# Patient Record
Sex: FEMALE | Race: WHITE | NOT HISPANIC OR LATINO | Employment: OTHER | URBAN - METROPOLITAN AREA
[De-identification: names, ages, dates, MRNs, and addresses within clinical notes are randomized per-mention and may not be internally consistent; named-entity substitution may affect disease eponyms.]

---

## 2017-02-28 ENCOUNTER — OFFICE VISIT (OUTPATIENT)
Dept: GASTROENTEROLOGY | Facility: CLINIC | Age: 72
End: 2017-02-28

## 2017-02-28 VITALS
WEIGHT: 116 LBS | SYSTOLIC BLOOD PRESSURE: 124 MMHG | HEIGHT: 61 IN | BODY MASS INDEX: 21.9 KG/M2 | DIASTOLIC BLOOD PRESSURE: 72 MMHG

## 2017-02-28 DIAGNOSIS — R19.5 HEME POSITIVE STOOL: Primary | ICD-10-CM

## 2017-02-28 DIAGNOSIS — K21.9 GASTROESOPHAGEAL REFLUX DISEASE, ESOPHAGITIS PRESENCE NOT SPECIFIED: ICD-10-CM

## 2017-02-28 DIAGNOSIS — Z79.01 ANTICOAGULANT LONG-TERM USE: ICD-10-CM

## 2017-02-28 DIAGNOSIS — D64.9 ANEMIA, UNSPECIFIED TYPE: ICD-10-CM

## 2017-02-28 DIAGNOSIS — M34.9 SCLERODERMA (HCC): ICD-10-CM

## 2017-02-28 PROCEDURE — 99214 OFFICE O/P EST MOD 30 MIN: CPT | Performed by: NURSE PRACTITIONER

## 2017-02-28 RX ORDER — PREDNISONE 1 MG/1
5 TABLET ORAL
COMMUNITY

## 2017-02-28 RX ORDER — FUROSEMIDE 40 MG/1
TABLET ORAL
Refills: 4 | COMMUNITY
Start: 2016-11-29

## 2017-02-28 RX ORDER — SODIUM CHLORIDE 0.9 % (FLUSH) 0.9 %
1-10 SYRINGE (ML) INJECTION AS NEEDED
Status: CANCELLED | OUTPATIENT
Start: 2017-02-28

## 2017-02-28 RX ORDER — MIRTAZAPINE 15 MG/1
15 TABLET, FILM COATED ORAL
COMMUNITY
Start: 2017-01-24

## 2017-02-28 RX ORDER — HYDROCODONE BITARTRATE AND ACETAMINOPHEN 7.5; 325 MG/1; MG/1
1 TABLET ORAL
COMMUNITY
Start: 2017-02-07 | End: 2017-03-09

## 2017-02-28 RX ORDER — ESOMEPRAZOLE MAGNESIUM 40 MG/1
40 CAPSULE, DELAYED RELEASE ORAL DAILY
Qty: 90 CAPSULE | Refills: 3 | Status: SHIPPED | OUTPATIENT
Start: 2017-02-28

## 2017-02-28 RX ORDER — LISINOPRIL 40 MG/1
40 TABLET ORAL
COMMUNITY

## 2017-02-28 RX ORDER — ESOMEPRAZOLE MAGNESIUM 40 MG/1
40 CAPSULE, DELAYED RELEASE ORAL
COMMUNITY
End: 2017-02-28

## 2017-02-28 RX ORDER — ATORVASTATIN CALCIUM 10 MG/1
10 TABLET, FILM COATED ORAL
COMMUNITY
Start: 2016-09-23

## 2017-02-28 RX ORDER — CLOPIDOGREL BISULFATE 75 MG/1
75 TABLET ORAL
COMMUNITY
Start: 2017-02-10 | End: 2017-02-28

## 2017-02-28 NOTE — PROGRESS NOTES
Chief Complaint   Patient presents with   • blood on toilet paper   • Heartburn     Subjective     HPI  Catrian Bennett is a 71 y.o. female who presents or a follow-up visit visit.  She is previously followed by Dr. Whitmore with a history of scleroderma.  She continues to have issues with breathing and is followed by Dr. Whitney (Pulmonary).  She is also on Eliquis and her cardiologist would like to restart Plavix for her cardiac stents.  Seen by her PCP 2 weeks ago and found to have positive occult stools.  According to records her hemoglobin is slowly drifting drown.  In October 2016 her hemoglobin is 11.7 prior to that was 12.1.  When seen by her physician in the beginning of February 2017 it was 10.0.  She has a history of GERD that she feels is well controlled on his is esomeprazole.  She has occasional mild epigastric burning with no identifiable triggering or alleviating factors.  She denies NSAID use or alcohol use.  Denies bright red blood per rectum, black tarry stools, or hematemesis.   She denies heartburn, reflux, dysphagia, odynophagia, or globus sensation     We reviewed the colonoscopy results from 3/2015 with tics only.  Her last EGD was 2013 with a few benign gastric polyps and gastritis negative H. Pylori.    Past Medical History   Diagnosis Date   • Breast cancer    • Diverticulosis    • GERD (gastroesophageal reflux disease)    • Hyperlipidemia    • Hypertension    • Osteoarthritis    • Osteoporosis    • Ovarian cancer    • Scleroderma    • Shingles    • SVT (supraventricular tachycardia)      Past Surgical History   Procedure Laterality Date   • Breast augmentation     • Hysterectomy     • Tonsillectomy     • Cardiac ablation     • Mastectomy     • Cataract extraction     • Thyroid surgery     • Endoscopy  04/01/2013     gastric mucosal abnormality characterized by erythema, a few gastric polyps , chronic inflammation, benign polyps, occ bacteria but no definite h pylori   • Colonoscopy  03/19/2015      tics   • Colonoscopy  10/07/2009     tics, ih, ulceration         Social History     Social History   • Marital status: Unknown     Spouse name: N/A   • Number of children: N/A   • Years of education: N/A     Social History Main Topics   • Smoking status: Never Smoker   • Smokeless tobacco: None   • Alcohol use No   • Drug use: No   • Sexual activity: Not Asked     Other Topics Concern   • None     Social History Narrative         Current Outpatient Prescriptions:   •  apixaban (ELIQUIS) 5 MG tablet tablet, TAKE 1 TABLET BY MOUTH 2 (TWO) TIMES DAILY, Disp: , Rfl:   •  atorvastatin (LIPITOR) 10 MG tablet, Take 10 mg by mouth., Disp: , Rfl:   •  furosemide (LASIX) 40 MG tablet, TAKE 1 TABLET BY MOUTH EVERY DAY, Disp: , Rfl: 4  •  HYDROcodone-acetaminophen (NORCO) 7.5-325 MG per tablet, Take 1 tablet by mouth., Disp: , Rfl:   •  lisinopril (PRINIVIL,ZESTRIL) 40 MG tablet, Take 40 mg by mouth., Disp: , Rfl:   •  Macitentan (OPSUMIT) 10 MG tablet, , Disp: , Rfl:   •  metoprolol tartrate (LOPRESSOR) 25 MG tablet, Take 25 mg by mouth., Disp: , Rfl:   •  mirtazapine (REMERON) 15 MG tablet, Take 15 mg by mouth., Disp: , Rfl:   •  predniSONE (DELTASONE) 5 MG tablet, Take 5 mg by mouth., Disp: , Rfl:   •  esomeprazole (nexIUM) 40 MG capsule, Take 1 capsule by mouth Daily., Disp: 90 capsule, Rfl: 3    Review of Systems   Constitutional: Negative for appetite change and unexpected weight change.   HENT: Negative for trouble swallowing.    Respiratory: Positive for chest tightness and shortness of breath. Negative for choking.    Cardiovascular: Negative for chest pain.   Gastrointestinal: Positive for abdominal pain. Negative for abdominal distention, blood in stool, constipation, diarrhea, nausea and vomiting.   Musculoskeletal: Negative for back pain.   Skin: Negative for color change.   Neurological: Negative for dizziness.   Hematological: Does not bruise/bleed easily.   Psychiatric/Behavioral: Negative.        Objective    Vitals:    02/28/17 0912   BP: 124/72       Physical Exam   Constitutional: She is oriented to person, place, and time. She appears well-developed and well-nourished.   HENT:   Head: Normocephalic and atraumatic.   Eyes: Pupils are equal, round, and reactive to light.   Cardiovascular: Normal rate, regular rhythm and normal heart sounds.    Pulmonary/Chest: Effort normal and breath sounds normal.   Abdominal: Soft. Bowel sounds are normal. She exhibits no shifting dullness, no distension, no pulsatile liver, no fluid wave, no abdominal bruit, no ascites, no pulsatile midline mass and no mass. There is no hepatosplenomegaly. There is no tenderness. There is no rigidity and no guarding. No hernia.   Genitourinary:   Genitourinary Comments: Patient deferred rectal exam due to recent positive occult test . Denies hemorrhoids   Musculoskeletal: Normal range of motion.   Neurological: She is alert and oriented to person, place, and time.   Skin: Skin is warm and dry.   Psychiatric: She has a normal mood and affect. Her behavior is normal. Thought content normal.   Nursing note and vitals reviewed.      Assessment/Plan   Catrina was seen today for blood on toilet paper and heartburn.    Diagnoses and all orders for this visit:    Heme positive stool  -     Ferritin  -     Folate  -     Iron Profile  -     Vitamin B12  -     Transferrin  -     CBC & Differential  -     Comprehensive Metabolic Panel  -     Case Request; Standing  -     sodium chloride 0.9 % flush 1-10 mL; Infuse 1-10 mL into a venous catheter As Needed for line care.  -     Case Request  -     Case Request; Standing  -     sodium chloride 0.9 % flush 1-10 mL; Infuse 1-10 mL into a venous catheter As Needed for line care.  -     Case Request    Anemia, unspecified type  -     Ferritin  -     Folate  -     Iron Profile  -     Vitamin B12  -     Transferrin  -     CBC & Differential  -     Comprehensive Metabolic Panel  -     Case Request; Standing  -     sodium  chloride 0.9 % flush 1-10 mL; Infuse 1-10 mL into a venous catheter As Needed for line care.  -     Case Request  -     Case Request; Standing  -     sodium chloride 0.9 % flush 1-10 mL; Infuse 1-10 mL into a venous catheter As Needed for line care.  -     Case Request    Gastroesophageal reflux disease, esophagitis presence not specified    Scleroderma    Anticoagulant long-term use  -     Case Request; Standing  -     sodium chloride 0.9 % flush 1-10 mL; Infuse 1-10 mL into a venous catheter As Needed for line care.  -     Case Request    Other orders  -     Implement Anesthesia orders day of procedure.; Standing  -     Obtain informed consent; Standing  -     Verify bowel prep was successful; Standing  -     Give tap water enema if bowel prep was insufficient; Standing  -     Insert Peripheral IV; Standing  -     Saline Lock & Maintain IV Access; Standing  -     Implement Anesthesia orders day of procedure.; Standing  -     Obtain informed consent; Standing  -     Insert Peripheral IV; Standing  -     Saline Lock & Maintain IV Access; Standing  -     esomeprazole (nexIUM) 40 MG capsule; Take 1 capsule by mouth Daily.    Check anemia studies with heme positive stool and slowly decreasing hemoglobin while on Eliquis.  Her cardiologist also wants to start Plavix in addition to Eliqius. Will discuss with Dr. Whitmore risk/benefit of endoscopic evaluation given her pulmonary status.  We'll call Dr. [phone]  Pulmonary and her cardiologist, Dr Almeida [phone]  for permission to perform endoscopic evaluation. Eliquis will need to be held x 48hours.   For any additional questions, concerns or changes to your condition after today's office visit please contact the office at [phone].        Luzma SANDHU   [facility], [place], operated by [facility]  [address] [place] [id number]  Office: [phone]

## 2017-03-01 ENCOUNTER — TELEPHONE (OUTPATIENT)
Dept: GASTROENTEROLOGY | Facility: CLINIC | Age: 72
End: 2017-03-01

## 2017-03-01 LAB
ALBUMIN SERPL-MCNC: 4.2 G/DL (ref 3.5–5.2)
ALBUMIN/GLOB SERPL: 1.6 G/DL
ALP SERPL-CCNC: 183 U/L (ref 39–117)
ALT SERPL-CCNC: 20 U/L (ref 1–33)
AST SERPL-CCNC: 20 U/L (ref 1–32)
BASOPHILS # BLD AUTO: 0.04 10*3/MM3 (ref 0–0.2)
BASOPHILS NFR BLD AUTO: 0.5 % (ref 0–1.5)
BILIRUB SERPL-MCNC: 0.2 MG/DL (ref 0.1–1.2)
BUN SERPL-MCNC: 26 MG/DL (ref 8–23)
BUN/CREAT SERPL: 18.4 (ref 7–25)
CALCIUM SERPL-MCNC: 9.4 MG/DL (ref 8.6–10.5)
CHLORIDE SERPL-SCNC: 101 MMOL/L (ref 98–107)
CO2 SERPL-SCNC: 23.5 MMOL/L (ref 22–29)
CREAT SERPL-MCNC: 1.41 MG/DL (ref 0.57–1)
EOSINOPHIL # BLD AUTO: 0.25 10*3/MM3 (ref 0–0.7)
EOSINOPHIL NFR BLD AUTO: 3 % (ref 0.3–6.2)
ERYTHROCYTE [DISTWIDTH] IN BLOOD BY AUTOMATED COUNT: 17.2 % (ref 11.7–13)
FERRITIN SERPL-MCNC: 17.45 NG/ML (ref 13–150)
FOLATE SERPL-MCNC: 11.61 NG/ML (ref 4.78–24.2)
GLOBULIN SER CALC-MCNC: 2.6 GM/DL
GLUCOSE SERPL-MCNC: 76 MG/DL (ref 65–99)
HCT VFR BLD AUTO: 34.9 % (ref 35.6–45.5)
HGB BLD-MCNC: 10.3 G/DL (ref 11.9–15.5)
IMM GRANULOCYTES # BLD: 0.04 10*3/MM3 (ref 0–0.03)
IMM GRANULOCYTES NFR BLD: 0.5 % (ref 0–0.5)
IRON SATN MFR SERPL: 9 % (ref 20–50)
IRON SERPL-MCNC: 37 MCG/DL (ref 37–145)
LYMPHOCYTES # BLD AUTO: 1.05 10*3/MM3 (ref 0.9–4.8)
LYMPHOCYTES NFR BLD AUTO: 12.5 % (ref 19.6–45.3)
MCH RBC QN AUTO: 25.3 PG (ref 26.9–32)
MCHC RBC AUTO-ENTMCNC: 29.5 G/DL (ref 32.4–36.3)
MCV RBC AUTO: 85.7 FL (ref 80.5–98.2)
MONOCYTES # BLD AUTO: 0.49 10*3/MM3 (ref 0.2–1.2)
MONOCYTES NFR BLD AUTO: 5.8 % (ref 5–12)
NEUTROPHILS # BLD AUTO: 6.52 10*3/MM3 (ref 1.9–8.1)
NEUTROPHILS NFR BLD AUTO: 77.7 % (ref 42.7–76)
PLATELET # BLD AUTO: 334 10*3/MM3 (ref 140–500)
POTASSIUM SERPL-SCNC: 4.5 MMOL/L (ref 3.5–5.2)
PROT SERPL-MCNC: 6.8 G/DL (ref 6–8.5)
RBC # BLD AUTO: 4.07 10*6/MM3 (ref 3.9–5.2)
SODIUM SERPL-SCNC: 141 MMOL/L (ref 136–145)
TIBC SERPL-MCNC: 402 MCG/DL
TRANSFERRIN SERPL-MCNC: 277 MG/DL (ref 200–370)
UIBC SERPL-MCNC: 365 MCG/DL
VIT B12 SERPL-MCNC: 470 PG/ML (ref 211–946)
WBC # BLD AUTO: 8.39 10*3/MM3 (ref 4.5–10.7)

## 2017-03-01 NOTE — TELEPHONE ENCOUNTER
Call  placed to Dr. Phan patient's pulmonologist for approval to perform endoscopic evaluation .Spoke with staff member -she will placed request into physician and return call with response.

## 2017-03-01 NOTE — TELEPHONE ENCOUNTER
Call placed to Dr. Parks.   Request  request to hold Eliquis ×48 hours prior to endoscopic evaluation.  Message left onhis BLACKWOOD's voicemail to please call the office with his response.

## 2017-03-03 ENCOUNTER — TELEPHONE (OUTPATIENT)
Dept: GASTROENTEROLOGY | Facility: CLINIC | Age: 72
End: 2017-03-03

## 2017-03-03 DIAGNOSIS — D64.9 ANEMIA, UNSPECIFIED TYPE: Primary | ICD-10-CM

## 2017-03-03 NOTE — TELEPHONE ENCOUNTER
Message sent to RNs to please call her pulmonologist and cardiologist again next week for approval for endoscopic evaluation.

## 2017-03-06 NOTE — TELEPHONE ENCOUNTER
Call to Pulmonologist, Dr Sabino Phan @ [phone].  Spoke to Marlyn to request pulmonary clearance.  Pt to be seen on 3/13 - will include pulmonary clearance request with eval.     Call to Cardiologist, Dr Richard Alvarez @ [phone].  VM left for April, MA, to request clearance to hold Eliquis for 48 hour prior to procedure. Awaiting reply.

## 2017-03-07 NOTE — TELEPHONE ENCOUNTER
Received call from April from Dr Alvarez who advised that the pt can hold her eloquis for 48 hrs prior to procedure per Dr Alvarez.

## 2017-03-09 ENCOUNTER — TELEPHONE (OUTPATIENT)
Dept: GASTROENTEROLOGY | Facility: CLINIC | Age: 72
End: 2017-03-09

## 2017-03-09 NOTE — TELEPHONE ENCOUNTER
----- Message from Cassy Joseph sent at 3/9/2017 11:25 AM EST -----  Regarding: PT REQUESTING LABS BE FAXED TO  Contact: [phone]  DR SHAR GARCIA FAX#[phone].

## 2017-03-15 NOTE — TELEPHONE ENCOUNTER
Call to Dr Sabino Phan's office as f/u to appt pt was to have had on 3/13 to check for pulmonary clearance.  Meli states that clearance given in o/v note - will fax to [phone].

## 2017-03-16 NOTE — TELEPHONE ENCOUNTER
- I am okay with doing this colonoscopy with her off of her anticoagulation for 48 hours prior to the colonoscopy.  As long as the patient is comfortable with this then lets go ahead and have this done if she wants to talk to me about an I'm happy to call her to discuss but otherwise was just plan on doing a colonoscopy and have her hold the Eliquis for 48 hours prior to the colonoscopy.  Also on the day of the colonoscopy please order for the endoscopy nurse is to get a CBC to evaluate her anemia. kelvin

## 2017-03-16 NOTE — TELEPHONE ENCOUNTER
Called pt and advised that Dr Bean gave her clearance for her c/s  And Dr Alvarez advised she can hold her eloquis for 48hr prior to her c/s on 03/24.  Advised pt the last day for her to take this is 03/21.  Pt verb understanding.     Message sent to update Dr Whitmore.

## 2017-03-17 NOTE — TELEPHONE ENCOUNTER
Cbc entered thru epic for day of scope 03/24.  Also called the pt advised her on the day of her scope to make sure endo gets her lab work. Pt verb understanding.

## 2017-03-24 ENCOUNTER — ANESTHESIA (OUTPATIENT)
Dept: GASTROENTEROLOGY | Facility: HOSPITAL | Age: 72
End: 2017-03-24

## 2017-03-24 ENCOUNTER — ANESTHESIA EVENT (OUTPATIENT)
Dept: GASTROENTEROLOGY | Facility: HOSPITAL | Age: 72
End: 2017-03-24

## 2017-03-24 ENCOUNTER — HOSPITAL ENCOUNTER (OUTPATIENT)
Facility: HOSPITAL | Age: 72
Setting detail: HOSPITAL OUTPATIENT SURGERY
Discharge: HOME | End: 2017-03-24
Attending: INTERNAL MEDICINE | Admitting: INTERNAL MEDICINE

## 2017-03-24 VITALS
HEART RATE: 86 BPM | SYSTOLIC BLOOD PRESSURE: 110 MMHG | WEIGHT: 110.44 LBS | BODY MASS INDEX: 20.87 KG/M2 | TEMPERATURE: 97.8 F | RESPIRATION RATE: 16 BRPM | OXYGEN SATURATION: 99 % | DIASTOLIC BLOOD PRESSURE: 67 MMHG

## 2017-03-24 DIAGNOSIS — D64.9 ANEMIA, UNSPECIFIED TYPE: ICD-10-CM

## 2017-03-24 DIAGNOSIS — R19.5 HEME POSITIVE STOOL: ICD-10-CM

## 2017-03-24 LAB
BASOPHILS # BLD AUTO: 0.02 10*3/MM3 (ref 0–0.2)
BASOPHILS NFR BLD AUTO: 0.4 % (ref 0–1.5)
DEPRECATED RDW RBC AUTO: 53 FL (ref 37–54)
EOSINOPHIL # BLD AUTO: 0.03 10*3/MM3 (ref 0–0.7)
EOSINOPHIL NFR BLD AUTO: 0.6 % (ref 0.3–6.2)
ERYTHROCYTE [DISTWIDTH] IN BLOOD BY AUTOMATED COUNT: 16.9 % (ref 11.7–13)
HCT VFR BLD AUTO: 33.2 % (ref 35.6–45.5)
HGB BLD-MCNC: 9.8 G/DL (ref 11.9–15.5)
IMM GRANULOCYTES # BLD: 0.02 10*3/MM3 (ref 0–0.03)
IMM GRANULOCYTES NFR BLD: 0.4 % (ref 0–0.5)
LYMPHOCYTES # BLD AUTO: 0.57 10*3/MM3 (ref 0.9–4.8)
LYMPHOCYTES NFR BLD AUTO: 10.8 % (ref 19.6–45.3)
MCH RBC QN AUTO: 25.5 PG (ref 26.9–32)
MCHC RBC AUTO-ENTMCNC: 29.5 G/DL (ref 32.4–36.3)
MCV RBC AUTO: 86.2 FL (ref 80.5–98.2)
MONOCYTES # BLD AUTO: 0.42 10*3/MM3 (ref 0.2–1.2)
MONOCYTES NFR BLD AUTO: 8 % (ref 5–12)
NEUTROPHILS # BLD AUTO: 4.22 10*3/MM3 (ref 1.9–8.1)
NEUTROPHILS NFR BLD AUTO: 79.8 % (ref 42.7–76)
PLATELET # BLD AUTO: 209 10*3/MM3 (ref 140–500)
PMV BLD AUTO: 9 FL (ref 6–12)
RBC # BLD AUTO: 3.85 10*6/MM3 (ref 3.9–5.2)
WBC NRBC COR # BLD: 5.28 10*3/MM3 (ref 4.5–10.7)

## 2017-03-24 PROCEDURE — 25010000002 PROPOFOL 10 MG/ML EMULSION: Performed by: ANESTHESIOLOGY

## 2017-03-24 PROCEDURE — 87081 CULTURE SCREEN ONLY: CPT | Performed by: INTERNAL MEDICINE

## 2017-03-24 PROCEDURE — 85025 COMPLETE CBC W/AUTO DIFF WBC: CPT | Performed by: INTERNAL MEDICINE

## 2017-03-24 PROCEDURE — 88305 TISSUE EXAM BY PATHOLOGIST: CPT | Performed by: INTERNAL MEDICINE

## 2017-03-24 PROCEDURE — 88312 SPECIAL STAINS GROUP 1: CPT | Performed by: INTERNAL MEDICINE

## 2017-03-24 PROCEDURE — 45380 COLONOSCOPY AND BIOPSY: CPT | Performed by: INTERNAL MEDICINE

## 2017-03-24 PROCEDURE — 43239 EGD BIOPSY SINGLE/MULTIPLE: CPT | Performed by: INTERNAL MEDICINE

## 2017-03-24 RX ORDER — SODIUM CHLORIDE, SODIUM LACTATE, POTASSIUM CHLORIDE, CALCIUM CHLORIDE 600; 310; 30; 20 MG/100ML; MG/100ML; MG/100ML; MG/100ML
1000 INJECTION, SOLUTION INTRAVENOUS CONTINUOUS PRN
Status: DISCONTINUED | OUTPATIENT
Start: 2017-03-24 | End: 2017-03-24 | Stop reason: HOSPADM

## 2017-03-24 RX ORDER — LIDOCAINE HYDROCHLORIDE 20 MG/ML
INJECTION, SOLUTION INFILTRATION; PERINEURAL AS NEEDED
Status: DISCONTINUED | OUTPATIENT
Start: 2017-03-24 | End: 2017-03-24 | Stop reason: SURG

## 2017-03-24 RX ORDER — PROPOFOL 10 MG/ML
VIAL (ML) INTRAVENOUS AS NEEDED
Status: DISCONTINUED | OUTPATIENT
Start: 2017-03-24 | End: 2017-03-24 | Stop reason: SURG

## 2017-03-24 RX ADMIN — SODIUM CHLORIDE, POTASSIUM CHLORIDE, SODIUM LACTATE AND CALCIUM CHLORIDE 1000 ML: 600; 310; 30; 20 INJECTION, SOLUTION INTRAVENOUS at 15:15

## 2017-03-24 RX ADMIN — PROPOFOL 50 MG: 10 INJECTION, EMULSION INTRAVENOUS at 16:35

## 2017-03-24 RX ADMIN — LIDOCAINE HYDROCHLORIDE 20 MG: 20 INJECTION, SOLUTION INFILTRATION; PERINEURAL at 16:35

## 2017-03-24 RX ADMIN — SODIUM CHLORIDE, POTASSIUM CHLORIDE, SODIUM LACTATE AND CALCIUM CHLORIDE: 600; 310; 30; 20 INJECTION, SOLUTION INTRAVENOUS at 16:35

## 2017-03-24 NOTE — ANESTHESIA POSTPROCEDURE EVALUATION
Patient: Catrina Bennett    Procedure Summary     Date Anesthesia Start Anesthesia Stop Room / Location    03/24/17 1635 1727  [facility] /  [facility]       Procedure Diagnosis Surgeon Provider    COLONOSCOPY TO CECUM AND INTO TERMINAL ILEUM WITH COLD BIOPSY POLYPECTOMIES (N/A ); ESOPHAGOGASTRODUODENOSCOPY WITH COLD BIOPSIES (N/A Esophagus) Heme positive stool; Anemia, unspecified type  (Heme positive stool [R19.5]; Anemia, unspecified type [D64.9]) MD Colin Colorado MD          Anesthesia Type: MAC  Last vitals  BP (!) 152/105 (03/24/17 1725)    Temp      Pulse 93 (03/24/17 1725)   Resp 16 (03/24/17 1725)    SpO2 99 % (03/24/17 1725)      Post Anesthesia Care and Evaluation    Patient location during evaluation: PACU  Patient participation: complete - patient participated  Level of consciousness: awake and alert  Pain management: adequate  Airway patency: patent  Anesthetic complications: No anesthetic complications    Cardiovascular status: acceptable  Respiratory status: acceptable  Hydration status: acceptable

## 2017-03-24 NOTE — H&P
Chief Complaint   Patient presents with   • blood on toilet paper   • Heartburn         Subjective          HPI  Catrina Bennett is a 71 y.o. female who presents or a follow-up visit visit. She is previously followed by Dr. Whitmore with a history of scleroderma. She continues to have issues with breathing and is followed by Dr. Whitney (Pulmonary). She is also on Eliquis and her cardiologist would like to restart Plavix for her cardiac stents. Seen by her PCP 2 weeks ago and found to have positive occult stools.  According to records her hemoglobin is slowly drifting drown. In October 2016 her hemoglobin is 11.7 prior to that was 12.1. When seen by her physician in the beginning of February 2017 it was 10.0.  She has a history of GERD that she feels is well controlled on his is esomeprazole. She has occasional mild epigastric burning with no identifiable triggering or alleviating factors. She denies NSAID use or alcohol use. Denies bright red blood per rectum, black tarry stools, or hematemesis.  She denies heartburn, reflux, dysphagia, odynophagia, or globus sensation   We reviewed the colonoscopy results from 3/2015 with tics only.  Her last EGD was 2013 with a few benign gastric polyps and gastritis negative H. Pylori.      Medical History         Past Medical History   Diagnosis Date   • Breast cancer     • Diverticulosis     • GERD (gastroesophageal reflux disease)     • Hyperlipidemia     • Hypertension     • Osteoarthritis     • Osteoporosis     • Ovarian cancer     • Scleroderma     • Shingles     • SVT (supraventricular tachycardia)            Surgical History           Past Surgical History   Procedure Laterality Date   • Breast augmentation       • Hysterectomy       • Tonsillectomy       • Cardiac ablation       • Mastectomy       • Cataract extraction       • Thyroid surgery       • Endoscopy   04/01/2013       gastric mucosal abnormality characterized by erythema, a few gastric polyps , chronic inflammation,  benign polyps, occ bacteria but no definite h pylori   • Colonoscopy   03/19/2015       tics   • Colonoscopy   10/07/2009       tics, ih, ulceration                Social History    Social History            Social History   • Marital status: Unknown       Spouse name: N/A   • Number of children: N/A   • Years of education: N/A           Social History Main Topics   • Smoking status: Never Smoker   • Smokeless tobacco: None   • Alcohol use No   • Drug use: No   • Sexual activity: Not Asked           Other Topics Concern   • None      Social History Narrative               Current Outpatient Prescriptions:   • apixaban (ELIQUIS) 5 MG tablet tablet, TAKE 1 TABLET BY MOUTH 2 (TWO) TIMES DAILY, Disp: , Rfl:   • atorvastatin (LIPITOR) 10 MG tablet, Take 10 mg by mouth., Disp: , Rfl:   • furosemide (LASIX) 40 MG tablet, TAKE 1 TABLET BY MOUTH EVERY DAY, Disp: , Rfl: 4  • HYDROcodone-acetaminophen (NORCO) 7.5-325 MG per tablet, Take 1 tablet by mouth., Disp: , Rfl:   • lisinopril (PRINIVIL,ZESTRIL) 40 MG tablet, Take 40 mg by mouth., Disp: , Rfl:   • Macitentan (OPSUMIT) 10 MG tablet, , Disp: , Rfl:   • metoprolol tartrate (LOPRESSOR) 25 MG tablet, Take 25 mg by mouth., Disp: , Rfl:   • mirtazapine (REMERON) 15 MG tablet, Take 15 mg by mouth., Disp: , Rfl:   • predniSONE (DELTASONE) 5 MG tablet, Take 5 mg by mouth., Disp: , Rfl:   • esomeprazole (nexIUM) 40 MG capsule, Take 1 capsule by mouth Daily., Disp: 90 capsule, Rfl: 3     Review of Systems   Constitutional: Negative for appetite change and unexpected weight change.   HENT: Negative for trouble swallowing.   Respiratory: Positive for chest tightness and shortness of breath. Negative for choking.   Cardiovascular: Negative for chest pain.   Gastrointestinal: Positive for abdominal pain. Negative for abdominal distention, blood in stool, constipation, diarrhea, nausea and vomiting.   Musculoskeletal: Negative for back pain.   Skin: Negative for color change.    Neurological: Negative for dizziness.   Hematological: Does not bruise/bleed easily.   Psychiatric/Behavioral: Negative.            Objective           Vitals:     02/28/17 0912   BP: 124/72         Physical Exam   Constitutional: She is oriented to person, place, and time. She appears well-developed and well-nourished.   HENT:   Head: Normocephalic and atraumatic.   Eyes: Pupils are equal, round, and reactive to light.   Cardiovascular: Normal rate, regular rhythm and normal heart sounds.   Pulmonary/Chest: Effort normal and breath sounds normal.   Abdominal: Soft. Bowel sounds are normal. She exhibits no shifting dullness, no distension, no pulsatile liver, no fluid wave, no abdominal bruit, no ascites, no pulsatile midline mass and no mass. There is no hepatosplenomegaly. There is no tenderness. There is no rigidity and no guarding. No hernia.   Genitourinary:   Genitourinary Comments: Patient deferred rectal exam due to recent positive occult test . Denies hemorrhoids   Musculoskeletal: Normal range of motion.   Neurological: She is alert and oriented to person, place, and time.   Skin: Skin is warm and dry.   Psychiatric: She has a normal mood and affect. Her behavior is normal. Thought content normal.   Nursing note and vitals reviewed.           Assessment/Plan       Catrina was seen today for blood on toilet paper and heartburn.     Diagnoses and all orders for this visit:     Heme positive stool  - Ferritin  - Folate  - Iron Profile  - Vitamin B12  - Transferrin  - CBC & Differential  - Comprehensive Metabolic Panel  - Case Request; Standing  - sodium chloride 0.9 % flush 1-10 mL; Infuse 1-10 mL into a venous catheter As Needed for line care.  - Case Request  - Case Request; Standing  - sodium chloride 0.9 % flush 1-10 mL; Infuse 1-10 mL into a venous catheter As Needed for line care.  - Case Request     Anemia, unspecified type  - Ferritin  - Folate  - Iron Profile  - Vitamin B12  - Transferrin  - CBC &  Differential  - Comprehensive Metabolic Panel  - Case Request; Standing  - sodium chloride 0.9 % flush 1-10 mL; Infuse 1-10 mL into a venous catheter As Needed for line care.  - Case Request  - Case Request; Standing  - sodium chloride 0.9 % flush 1-10 mL; Infuse 1-10 mL into a venous catheter As Needed for line care.  - Case Request     Gastroesophageal reflux disease, esophagitis presence not specified     Scleroderma     Anticoagulant long-term use  - Case Request; Standing  - sodium chloride 0.9 % flush 1-10 mL; Infuse 1-10 mL into a venous catheter As Needed for line care.  - Case Request     Other orders  - Implement Anesthesia orders day of procedure.; Standing  - Obtain informed consent; Standing  - Verify bowel prep was successful; Standing  - Give tap water enema if bowel prep was insufficient; Standing  - Insert Peripheral IV; Standing  - Saline Lock & Maintain IV Access; Standing  - Implement Anesthesia orders day of procedure.; Standing  - Obtain informed consent; Standing  - Insert Peripheral IV; Standing  - Saline Lock & Maintain IV Access; Standing  - esomeprazole (nexIUM) 40 MG capsule; Take 1 capsule by mouth Daily.     Check anemia studies with heme positive stool and slowly decreasing hemoglobin while on Eliquis. Her cardiologist also wants to start Plavix in addition to Eliqius. Will discuss with Dr. Whitmore risk/benefit of endoscopic evaluation given her pulmonary status.  We'll call Dr. [phone]) [phone] Pulmonary and her cardiologist, Dr Almeida [phone]  for permission to perform endoscopic evaluation. Eliquis will need to be held x 48hours.   For any additional questions, concerns or changes to your condition after today's office visit please contact the office at [phone].          Luzma Jones APRROLA   [facility]  [address] [place] [address] - [address]  [place] [address]  Office: [phone]     3/24/17 - NO change from the above H and P. Van Whitmore,  M.D.

## 2017-03-24 NOTE — DISCHARGE INSTRUCTIONS
For the next 24 hours patient needs to be with a responsible adult.    For 24 hours DO NOT drive, operate machinery, appliances, drink alcohol, make important decisions or sign legal documents.    Start with a light or bland diet and advance to regular diet as tolerated.    Follow recommendations on procedure report provided by your doctor.    Call Dr Whitmore for problems [phone] and for biopsy results in 7- 10 days    Problems may include but not limited to: large amounts of bleeding, trouble breathing, repeated vomiting, severe unrelieved pain, fever or chills.

## 2017-03-24 NOTE — ANESTHESIA PREPROCEDURE EVALUATION
Anesthesia Evaluation     Patient summary reviewed and Nursing notes reviewed      Airway   Mallampati: II  no difficulty expected  Dental      Pulmonary - negative pulmonary ROS   Cardiovascular - negative cardio ROS        Neuro/Psych- negative ROS  GI/Hepatic/Renal/Endo - negative ROS     Musculoskeletal (-) negative ROS    Abdominal    Substance History - negative use     OB/GYN negative ob/gyn ROS         Other                                  Anesthesia Plan    ASA 3     MAC     Anesthetic plan and risks discussed with patient.

## 2017-03-26 LAB — UREASE TISS QL: NEGATIVE

## 2017-03-27 LAB
CYTO UR: NORMAL
LAB AP CASE REPORT: NORMAL
Lab: NORMAL
PATH REPORT.FINAL DX SPEC: NORMAL
PATH REPORT.GROSS SPEC: NORMAL

## 2017-04-06 ENCOUNTER — TELEPHONE (OUTPATIENT)
Dept: GASTROENTEROLOGY | Facility: CLINIC | Age: 72
End: 2017-04-06

## 2017-04-06 NOTE — TELEPHONE ENCOUNTER
Called pt and advised per Dr Whitmore that the path from the egd looked ok.  The colon polyp that was removed was not cancerous but was precancerous and he recommends a repeat c/s i 2-3 yrs.  Pt verb understanding.     C/s placed in recall for 03/25/2019.

## 2017-04-06 NOTE — TELEPHONE ENCOUNTER
----- Message from Van Whitmore MD sent at 4/2/2017  3:11 PM EDT -----  Tell her that pathology from the EGD looked okay.  The colon polyps that were removed were not cancerous but some of them were precancerous.  I recommend a repeat colonoscopy in 2-3 years.

## 2017-07-12 ENCOUNTER — OFFICE VISIT (OUTPATIENT)
Dept: GASTROENTEROLOGY | Facility: CLINIC | Age: 72
End: 2017-07-12

## 2017-07-12 ENCOUNTER — TELEPHONE (OUTPATIENT)
Dept: GASTROENTEROLOGY | Facility: CLINIC | Age: 72
End: 2017-07-12

## 2017-07-12 VITALS
HEIGHT: 61 IN | BODY MASS INDEX: 20.39 KG/M2 | SYSTOLIC BLOOD PRESSURE: 112 MMHG | TEMPERATURE: 98.4 F | WEIGHT: 108 LBS | DIASTOLIC BLOOD PRESSURE: 58 MMHG

## 2017-07-12 DIAGNOSIS — R63.4 WEIGHT LOSS: ICD-10-CM

## 2017-07-12 DIAGNOSIS — K21.9 GASTROESOPHAGEAL REFLUX DISEASE, ESOPHAGITIS PRESENCE NOT SPECIFIED: ICD-10-CM

## 2017-07-12 DIAGNOSIS — D50.8 OTHER IRON DEFICIENCY ANEMIA: Primary | ICD-10-CM

## 2017-07-12 DIAGNOSIS — K63.5 COLON POLYPS: ICD-10-CM

## 2017-07-12 PROCEDURE — 99213 OFFICE O/P EST LOW 20 MIN: CPT | Performed by: INTERNAL MEDICINE

## 2017-07-12 NOTE — TELEPHONE ENCOUNTER
----- Message from Patience Baltazar sent at 7/12/2017  4:05 PM EDT -----  Regarding: RECORDS  Dr. Whitmore asked that we obtain labs from Dr. Phan. Please see 7/12/17 office visit note.  Thanks    Patience

## 2017-07-12 NOTE — PROGRESS NOTES
"Chief Complaint   Patient presents with   • Follow-up       History of Present Illness: She stopped the iron pills. She had labs drawn about one month ago and her CBC was \"OK\".  She is losing lung function. She is on O2 per NC. NO abdominal or chest pain. No GERD symptoms while on Nexium 40 mg/day. No rectal bleeding or melena. NO diarrhea or constipaiton. She is losing weight because of no appetite.     Past Medical History:   Diagnosis Date   • Diverticulosis    • GERD (gastroesophageal reflux disease)    • Hyperlipidemia    • Hypertension    • Osteoarthritis    • Osteoporosis    • Ovarian cancer    • Raynaud's disease    • Scleroderma    • Shingles    • SVT (supraventricular tachycardia)        Past Surgical History:   Procedure Laterality Date   • BREAST AUGMENTATION     • CARDIAC ABLATION     • CATARACT EXTRACTION     • COLONOSCOPY  03/19/2015    tics   • COLONOSCOPY  10/07/2009    tics, ih, ulceration   • COLONOSCOPY N/A 3/24/2017    tics, polyps   • ENDOSCOPY  04/01/2013    gastric mucosal abnormality characterized by erythema, a few gastric polyps , chronic inflammation, benign polyps, occ bacteria but no definite h pylori   • ENDOSCOPY N/A 3/24/2017    medium sized HH, erythematous mucosa in the stomach    • HYSTERECTOMY     • MASTECTOMY     • THYROID SURGERY     • TONSILLECTOMY           Current Outpatient Prescriptions:   •  apixaban (ELIQUIS) 5 MG tablet tablet, TAKE 1 TABLET BY MOUTH 2 (TWO) TIMES DAILY, Disp: , Rfl:   •  esomeprazole (nexIUM) 40 MG capsule, Take 1 capsule by mouth Daily., Disp: 90 capsule, Rfl: 3  •  furosemide (LASIX) 40 MG tablet, TAKE 1 TABLET BY MOUTH EVERY DAY, Disp: , Rfl: 4  •  lisinopril (PRINIVIL,ZESTRIL) 40 MG tablet, Take 40 mg by mouth., Disp: , Rfl:   •  metoprolol tartrate (LOPRESSOR) 25 MG tablet, Take 25 mg by mouth., Disp: , Rfl:   •  mirtazapine (REMERON) 15 MG tablet, Take 15 mg by mouth., Disp: , Rfl:   •  atorvastatin (LIPITOR) 10 MG tablet, Take 10 mg by mouth., " Disp: , Rfl:   •  Macitentan (OPSUMIT) 10 MG tablet, , Disp: , Rfl:   •  predniSONE (DELTASONE) 5 MG tablet, Take 5 mg by mouth., Disp: , Rfl:     Allergies   Allergen Reactions   • Penicillins      Numbness around lips       Family History   Problem Relation Age of Onset   • Heart disease Father        Social History     Social History   • Marital status: Unknown     Spouse name: N/A   • Number of children: N/A   • Years of education: N/A     Occupational History   • Not on file.     Social History Main Topics   • Smoking status: Never Smoker   • Smokeless tobacco: Not on file   • Alcohol use No   • Drug use: No   • Sexual activity: Defer     Other Topics Concern   • Not on file     Social History Narrative       Review of Systems   All other systems reviewed and are negative.      Vitals:    07/12/17 1517   BP: 112/58   Temp: 98.4 °F (36.9 °C)       Physical Exam   Constitutional: She is oriented to person, place, and time. She appears well-developed and well-nourished. No distress.   HENT:   Head: Normocephalic and atraumatic. Hair is normal.   Right Ear: Hearing, tympanic membrane, external ear and ear canal normal. No drainage. No decreased hearing is noted.   Left Ear: Hearing, tympanic membrane, external ear and ear canal normal. No decreased hearing is noted.   Nose: No nasal deformity.   Mouth/Throat: Oropharynx is clear and moist.   Eyes: Conjunctivae, EOM and lids are normal. Pupils are equal, round, and reactive to light. Right eye exhibits no discharge. Left eye exhibits no discharge.   Neck: Normal range of motion. Neck supple. No JVD present. No tracheal deviation present. No thyromegaly present.   Cardiovascular: Normal rate, regular rhythm, normal heart sounds, intact distal pulses and normal pulses.  Exam reveals no gallop and no friction rub.    No murmur heard.  Pulmonary/Chest: Effort normal and breath sounds normal. No respiratory distress. She has no wheezes. She has no rales. She exhibits no  tenderness.   Abdominal: Soft. Bowel sounds are normal. She exhibits no distension and no mass. There is no tenderness. There is no rebound and no guarding. No hernia.   Genitourinary: Rectal exam shows guaiac negative stool.   Genitourinary Comments: Normal anorectal exam.   Musculoskeletal: Normal range of motion. She exhibits no edema, tenderness or deformity.   Lymphadenopathy:     She has no cervical adenopathy.   Neurological: She is alert and oriented to person, place, and time. She has normal reflexes. She displays normal reflexes. No cranial nerve deficit. She exhibits normal muscle tone. Coordination normal.   Skin: Skin is warm and dry. No rash noted. She is not diaphoretic. No erythema.   Psychiatric: She has a normal mood and affect. Her behavior is normal. Judgment and thought content normal.   Vitals reviewed.      Catrina was seen today for follow-up.    Diagnoses and all orders for this visit:    Other iron deficiency anemia    Gastroesophageal reflux disease, esophagitis presence not specified    Weight loss    Colon polyps      Assessment:  1) h/o colonic polyps.  2) h/o iron deficiency anemia  3) Weight loss  4) GERD    Recommendations:  1) Repeat c/s in 2-3 yrs.   2)   3) Get labs from Dr. Kel Phan  4) Go back on iron sulfate 325 mg one pill BID  5) f/u 3 mos.    No Follow-up on file.

## 2017-07-14 NOTE — TELEPHONE ENCOUNTER
Records received from pulmonary doctor- Dr Phan and scanned under media tab.  Dr Whitmore notified.

## 2017-07-14 NOTE — TELEPHONE ENCOUNTER
"Tell her that I reviewed the labs that she had earlier this month in Dr. Phan's office.  Her CBC was not \"normal\".  She did have mild anemia although it was better.  Make sure that she is taking over-the-counter iron sulfate 325 mg one pill by mouth twice a day.  This is being done to build up your blood count to hopefully make you feel better.  When I see her back in 3 months I will recheck her blood count.  "

## 2017-07-17 NOTE — TELEPHONE ENCOUNTER
Called pt and advised per Dr Whitmore that he reviewed her labs that she had earlier this month in Dr Phan;s office.  Her cbc was not normal.  She did have mild anemia although it was better.  Advised to make sure she is taking otc iron sulfate 325mg one pill bid . This is being done to build up her blood count to hopefully make her feel better.  He will recheck her blood in 3 mo at her f/u.  Pt verb understanding.

## 2017-09-06 ENCOUNTER — APPOINTMENT (OUTPATIENT)
Dept: WOMENS IMAGING | Facility: HOSPITAL | Age: 72
End: 2017-09-06

## 2017-09-06 PROCEDURE — 77080 DXA BONE DENSITY AXIAL: CPT | Performed by: RADIOLOGY

## 2017-10-11 ENCOUNTER — OFFICE VISIT (OUTPATIENT)
Dept: GASTROENTEROLOGY | Facility: CLINIC | Age: 72
End: 2017-10-11

## 2017-10-11 VITALS
WEIGHT: 106 LBS | BODY MASS INDEX: 20.01 KG/M2 | SYSTOLIC BLOOD PRESSURE: 100 MMHG | TEMPERATURE: 98 F | HEIGHT: 61 IN | DIASTOLIC BLOOD PRESSURE: 60 MMHG

## 2017-10-11 DIAGNOSIS — R53.83 MALAISE AND FATIGUE: ICD-10-CM

## 2017-10-11 DIAGNOSIS — R53.81 MALAISE AND FATIGUE: ICD-10-CM

## 2017-10-11 DIAGNOSIS — K21.9 GASTROESOPHAGEAL REFLUX DISEASE, ESOPHAGITIS PRESENCE NOT SPECIFIED: ICD-10-CM

## 2017-10-11 DIAGNOSIS — D64.9 ANEMIA, UNSPECIFIED TYPE: Primary | ICD-10-CM

## 2017-10-11 DIAGNOSIS — K63.5 POLYP OF COLON, UNSPECIFIED PART OF COLON, UNSPECIFIED TYPE: ICD-10-CM

## 2017-10-11 PROCEDURE — 99213 OFFICE O/P EST LOW 20 MIN: CPT | Performed by: INTERNAL MEDICINE

## 2017-10-11 NOTE — PROGRESS NOTES
Chief Complaint   Patient presents with   • Follow-up       History of Present Illness: She has a h/o iron deficiency anemia. She isn't taking iron pills because of constipation.  She does occasionally have nose bleeding and has had nose cauterization by ENT. No rectal bleeding or melena. No nausea or vomiting. No diarrhea but she does have ocaisonal constipation. The heartburn is controlled with the Nexium. She has lost weight because she has no appetite. She is on Eliquis because of a fib. NO abdominal or chest pain. No dysphagia.     Past Medical History:   Diagnosis Date   • Anemia    • Diverticulosis    • GERD (gastroesophageal reflux disease)    • Hyperlipidemia    • Hypertension    • Osteoarthritis    • Osteoporosis    • Ovarian cancer    • Raynaud's disease    • Scleroderma    • Shingles    • SVT (supraventricular tachycardia)        Past Surgical History:   Procedure Laterality Date   • BREAST AUGMENTATION     • CARDIAC ABLATION     • CATARACT EXTRACTION     • COLONOSCOPY  03/19/2015    tics   • COLONOSCOPY  10/07/2009    tics, ih, ulceration   • COLONOSCOPY N/A 3/24/2017    tics, polyps   • ENDOSCOPY  04/01/2013    gastric mucosal abnormality characterized by erythema, a few gastric polyps , chronic inflammation, benign polyps, occ bacteria but no definite h pylori   • ENDOSCOPY N/A 3/24/2017    medium sized HH, erythematous mucosa in the stomach    • HYSTERECTOMY     • MASTECTOMY     • THYROID SURGERY     • TONSILLECTOMY           Current Outpatient Prescriptions:   •  apixaban (ELIQUIS) 5 MG tablet tablet, TAKE 1 TABLET BY MOUTH 2 (TWO) TIMES DAILY, Disp: , Rfl:   •  atorvastatin (LIPITOR) 10 MG tablet, Take 10 mg by mouth., Disp: , Rfl:   •  esomeprazole (nexIUM) 40 MG capsule, Take 1 capsule by mouth Daily., Disp: 90 capsule, Rfl: 3  •  furosemide (LASIX) 40 MG tablet, TAKE 1 TABLET BY MOUTH EVERY DAY, Disp: , Rfl: 4  •  lisinopril (PRINIVIL,ZESTRIL) 40 MG tablet, Take 40 mg by mouth., Disp: , Rfl:   •   Macitentan (OPSUMIT) 10 MG tablet, , Disp: , Rfl:   •  metoprolol tartrate (LOPRESSOR) 25 MG tablet, Take 25 mg by mouth., Disp: , Rfl:   •  mirtazapine (REMERON) 15 MG tablet, Take 15 mg by mouth., Disp: , Rfl:   •  predniSONE (DELTASONE) 5 MG tablet, Take 5 mg by mouth., Disp: , Rfl:     Allergies   Allergen Reactions   • Penicillins      Numbness around lips       Family History   Problem Relation Age of Onset   • Heart disease Father        Social History     Social History   • Marital status: Unknown     Spouse name: N/A   • Number of children: N/A   • Years of education: N/A     Occupational History   • Not on file.     Social History Main Topics   • Smoking status: Never Smoker   • Smokeless tobacco: Not on file   • Alcohol use No   • Drug use: No   • Sexual activity: Defer     Other Topics Concern   • Not on file     Social History Narrative       Review of Systems   All other systems reviewed and are negative.      Vitals:    10/11/17 1307   BP: 100/60   Temp: 98 °F (36.7 °C)       Physical Exam   Constitutional: She is oriented to person, place, and time. She appears well-developed and well-nourished. No distress.   Has a nasal cannula.    HENT:   Head: Normocephalic and atraumatic. Hair is normal.   Right Ear: Hearing, tympanic membrane, external ear and ear canal normal. No drainage. No decreased hearing is noted.   Left Ear: Hearing, tympanic membrane, external ear and ear canal normal. No decreased hearing is noted.   Nose: No nasal deformity.   Mouth/Throat: Oropharynx is clear and moist.   Eyes: Conjunctivae, EOM and lids are normal. Pupils are equal, round, and reactive to light. Right eye exhibits no discharge. Left eye exhibits no discharge.   Neck: Normal range of motion. Neck supple. No JVD present. No tracheal deviation present. No thyromegaly present.   Cardiovascular: Normal rate, regular rhythm, normal heart sounds, intact distal pulses and normal pulses.  Exam reveals no gallop and no  friction rub.    No murmur heard.  Pulmonary/Chest: Effort normal and breath sounds normal. No respiratory distress. She has no wheezes. She has no rales. She exhibits no tenderness.   Abdominal: Soft. Bowel sounds are normal. She exhibits no distension and no mass. There is no tenderness. There is no rebound and no guarding. No hernia.   Musculoskeletal: Normal range of motion. She exhibits no edema, tenderness or deformity.   Lymphadenopathy:     She has no cervical adenopathy.   Neurological: She is alert and oriented to person, place, and time. She has normal reflexes. She displays normal reflexes. No cranial nerve deficit. She exhibits normal muscle tone. Coordination normal.   Skin: Skin is warm and dry. No rash noted. She is not diaphoretic. No erythema.   Psychiatric: She has a normal mood and affect. Her behavior is normal. Judgment and thought content normal.   Vitals reviewed.      Catrina was seen today for follow-up.    Diagnoses and all orders for this visit:    Anemia, unspecified type  -     CBC & Differential  -     Comprehensive Metabolic Panel  -     Ferritin  -     Iron Profile  -     Vitamin B12  -     Folate RBC  -     Reticulocytes    Malaise and fatigue  -     CBC & Differential  -     Comprehensive Metabolic Panel  -     Ferritin  -     Iron Profile  -     Vitamin B12  -     Folate RBC  -     Reticulocytes    Gastroesophageal reflux disease, esophagitis presence not specified    Polyp of colon, unspecified part of colon, unspecified type         Assessment:  1) h/o colonic polyps.  2) h/o iron deficiency anemia  3) Weight loss  4) GERD     Recommendations:  1) Repeat c/s in 2-3 yrs.   2) Labs: CBC, ferritin. If she is still iron def then have her take iron gluconate or iron fumarate tabs. She doesn't want to go see a Hematologist yet.   3) I recommend that she go see a Hematologist. She doesn't want to.   4) f/u 6 mos.  5) Decrease the Nexium to 20 mg/day. We discussed the pros and cons of  chronic PPI use.    Return in about 6 months (around 4/11/2018).    Van Whitmore MD  10/11/2017

## 2017-10-12 LAB
ALBUMIN SERPL-MCNC: 4.1 G/DL (ref 3.5–5.2)
ALBUMIN/GLOB SERPL: 1.5 G/DL
ALP SERPL-CCNC: 165 U/L (ref 39–117)
ALT SERPL-CCNC: 15 U/L (ref 1–33)
AST SERPL-CCNC: 19 U/L (ref 1–32)
BASOPHILS # BLD AUTO: 0.03 10*3/MM3 (ref 0–0.2)
BASOPHILS NFR BLD AUTO: 0.4 % (ref 0–1.5)
BILIRUB SERPL-MCNC: 0.4 MG/DL (ref 0.1–1.2)
BUN SERPL-MCNC: 21 MG/DL (ref 8–23)
BUN/CREAT SERPL: 15.1 (ref 7–25)
CALCIUM SERPL-MCNC: 9.6 MG/DL (ref 8.6–10.5)
CHLORIDE SERPL-SCNC: 101 MMOL/L (ref 98–107)
CO2 SERPL-SCNC: 25.8 MMOL/L (ref 22–29)
CREAT SERPL-MCNC: 1.39 MG/DL (ref 0.57–1)
EOSINOPHIL # BLD AUTO: 0.17 10*3/MM3 (ref 0–0.7)
EOSINOPHIL NFR BLD AUTO: 2.2 % (ref 0.3–6.2)
ERYTHROCYTE [DISTWIDTH] IN BLOOD BY AUTOMATED COUNT: 16.6 % (ref 11.7–13)
FERRITIN SERPL-MCNC: 43.25 NG/ML (ref 13–150)
FOLATE BLD-MCNC: 484.4 NG/ML
FOLATE RBC-MCNC: 1404 NG/ML
GLOBULIN SER CALC-MCNC: 2.7 GM/DL
GLUCOSE SERPL-MCNC: 86 MG/DL (ref 65–99)
HCT VFR BLD AUTO: 34.5 % (ref 35.6–45.5)
HGB BLD-MCNC: 10.4 G/DL (ref 11.9–15.5)
IMM GRANULOCYTES # BLD: 0.02 10*3/MM3 (ref 0–0.03)
IMM GRANULOCYTES NFR BLD: 0.3 % (ref 0–0.5)
IRON SATN MFR SERPL: 12 % (ref 20–50)
IRON SERPL-MCNC: 41 MCG/DL (ref 37–145)
LYMPHOCYTES # BLD AUTO: 0.75 10*3/MM3 (ref 0.9–4.8)
LYMPHOCYTES NFR BLD AUTO: 9.6 % (ref 19.6–45.3)
MCH RBC QN AUTO: 27.5 PG (ref 26.9–32)
MCHC RBC AUTO-ENTMCNC: 30.1 G/DL (ref 32.4–36.3)
MCV RBC AUTO: 91.3 FL (ref 80.5–98.2)
MONOCYTES # BLD AUTO: 0.51 10*3/MM3 (ref 0.2–1.2)
MONOCYTES NFR BLD AUTO: 6.5 % (ref 5–12)
NEUTROPHILS # BLD AUTO: 6.35 10*3/MM3 (ref 1.9–8.1)
NEUTROPHILS NFR BLD AUTO: 81 % (ref 42.7–76)
PLATELET # BLD AUTO: 270 10*3/MM3 (ref 140–500)
POTASSIUM SERPL-SCNC: 4.3 MMOL/L (ref 3.5–5.2)
PROT SERPL-MCNC: 6.8 G/DL (ref 6–8.5)
RBC # BLD AUTO: 3.78 10*6/MM3 (ref 3.9–5.2)
RETICS/RBC NFR AUTO: 2.04 % (ref 0.5–1.5)
SODIUM SERPL-SCNC: 141 MMOL/L (ref 136–145)
TIBC SERPL-MCNC: 349 MCG/DL
UIBC SERPL-MCNC: 308 MCG/DL
VIT B12 SERPL-MCNC: 525 PG/ML (ref 211–946)
WBC # BLD AUTO: 7.83 10*3/MM3 (ref 4.5–10.7)

## 2017-10-17 NOTE — PROGRESS NOTES
Tell that her lab work shows that she is still mildly anemic with a hemoglobin of 10.4.  Her white count and platelet count are normal.  Her kidney function is mildly abnormal with a serum creatinine 1.39 which is about what it was 7 months ago.  Her alkaline phosphatase is still mildly elevated at 165 which is lower than it was 7 months ago.  She still is iron deficient.  I would recommend that she take some over-the-counter iron pills.  She could take iron fumarate or iron gluconate tablets.  I would recommend these instead of the more commonly used iron sulfate because the iron sulfate can cause more constipation.  So I would recommend that she ask her pharmacist where they sell iron gluconate or iron fumarate tablets (they are both sold OTC).  She can take one of these tablets by mouth daily.  If she tolerates 1 by mouth daily after the first month then I would start taking one of these pills by mouth twice daily and continue those until she sees me in the office in 6 months.

## 2017-10-19 ENCOUNTER — TELEPHONE (OUTPATIENT)
Dept: GASTROENTEROLOGY | Facility: CLINIC | Age: 72
End: 2017-10-19

## 2017-10-19 NOTE — TELEPHONE ENCOUNTER
Call to pt.  Advise per Dr Whitmore that lab work shows that still mildly anemic with a Hgb of 10.4.   WBC and PLT are normal.  Kidney function is mildly abn with Cr of 1.39, which is about what it was 7 mo's ago.  Alk phos still mildly elevated at 165, which is lower than it was 7 mo's ago.   Still iron deficient.  Recommend take OTC iron - iron fumarate or iron gluconate..  Use these instead of more commonly iron sulfate because that can cause more constipation.  Ask pharmacist to help find.  Take one daily.  If tolerates  One daily after the first month, then increase to twice daily and continue until seen in office in 6 mo's.    Pt verb understanding.   Requests fax lab results to Dr Sabino Phan.  Faxed via [facility] to .

## 2017-10-19 NOTE — TELEPHONE ENCOUNTER
----- Message from Van Whitmore MD sent at 10/17/2017  3:54 PM EDT -----  Tell that her lab work shows that she is still mildly anemic with a hemoglobin of 10.4.  Her white count and platelet count are normal.  Her kidney function is mildly abnormal with a serum creatinine 1.39 which is about what it was 7 months ago.  Her alkaline phosphatase is still mildly elevated at 165 which is lower than it was 7 months ago.  She still is iron deficient.  I would recommend that she take some over-the-counter iron pills.  She could take iron fumarate or iron gluconate tablets.  I would recommend these instead of the more commonly used iron sulfate because the iron sulfate can cause more constipation.  So I would recommend that she ask her pharmacist where they sell iron gluconate or iron fumarate tablets (they are both sold OTC).  She can take one of these tablets by mouth daily.  If she tolerates 1 by mouth daily after the first month then I would start taking one of these pills by mouth twice daily and continue those until she sees me in the office in 6 months.

## (undated) DEVICE — TUBING, SUCTION, 1/4" X 10', STRAIGHT: Brand: MEDLINE

## (undated) DEVICE — Device: Brand: DEFENDO AIR/WATER/SUCTION AND BIOPSY VALVE

## (undated) DEVICE — CANN NASL CO2 TRULINK W/O2 A/

## (undated) DEVICE — THE TORRENT IRRIGATION SCOPE CONNECTOR IS USED WITH THE TORRENT IRRIGATION TUBING TO PROVIDE IRRIGATION FLUIDS SUCH AS STERILE WATER DURING GASTROINTESTINAL ENDOSCOPIC PROCEDURES WHEN USED IN CONJUNCTION WITH AN IRRIGATION PUMP (OR ELECTROSURGICAL UNIT).: Brand: TORRENT

## (undated) DEVICE — FRCP BX RADJAW4 NDL 2.8 240CM LG OG BX40

## (undated) DEVICE — BITEBLOCK OMNI BLOC